# Patient Record
Sex: MALE
[De-identification: names, ages, dates, MRNs, and addresses within clinical notes are randomized per-mention and may not be internally consistent; named-entity substitution may affect disease eponyms.]

---

## 2020-11-27 ENCOUNTER — TRANSCRIPTION ENCOUNTER (OUTPATIENT)
Age: 20
End: 2020-11-27

## 2021-03-22 ENCOUNTER — TRANSCRIPTION ENCOUNTER (OUTPATIENT)
Age: 21
End: 2021-03-22

## 2024-01-22 PROBLEM — Z00.00 ENCOUNTER FOR PREVENTIVE HEALTH EXAMINATION: Status: ACTIVE | Noted: 2024-01-22

## 2024-01-24 DIAGNOSIS — M25.551 PAIN IN RIGHT HIP: ICD-10-CM

## 2024-01-25 ENCOUNTER — APPOINTMENT (OUTPATIENT)
Dept: ORTHOPEDIC SURGERY | Facility: CLINIC | Age: 24
End: 2024-01-25
Payer: COMMERCIAL

## 2024-01-25 VITALS
HEIGHT: 69 IN | HEART RATE: 92 BPM | BODY MASS INDEX: 33.33 KG/M2 | WEIGHT: 225 LBS | DIASTOLIC BLOOD PRESSURE: 73 MMHG | SYSTOLIC BLOOD PRESSURE: 116 MMHG

## 2024-01-25 DIAGNOSIS — Z78.9 OTHER SPECIFIED HEALTH STATUS: ICD-10-CM

## 2024-01-25 DIAGNOSIS — M70.61 TROCHANTERIC BURSITIS, RIGHT HIP: ICD-10-CM

## 2024-01-25 DIAGNOSIS — S76.311A STRAIN OF MUSCLE, FASCIA AND TENDON OF THE POSTERIOR MUSCLE GROUP AT THIGH LEVEL, RIGHT THIGH, INITIAL ENCOUNTER: ICD-10-CM

## 2024-01-25 PROCEDURE — 73502 X-RAY EXAM HIP UNI 2-3 VIEWS: CPT | Mod: RT

## 2024-01-25 PROCEDURE — 99203 OFFICE O/P NEW LOW 30 MIN: CPT

## 2024-01-25 PROCEDURE — 73552 X-RAY EXAM OF FEMUR 2/>: CPT | Mod: RT

## 2024-01-25 RX ORDER — DICLOFENAC SODIUM 1% 10 MG/G
1 GEL TOPICAL DAILY
Qty: 1 | Refills: 1 | Status: ACTIVE | COMMUNITY
Start: 2024-01-25 | End: 1900-01-01

## 2024-01-29 ENCOUNTER — NON-APPOINTMENT (OUTPATIENT)
Age: 24
End: 2024-01-29

## 2024-01-29 ENCOUNTER — APPOINTMENT (OUTPATIENT)
Dept: ORTHOPEDIC SURGERY | Facility: CLINIC | Age: 24
End: 2024-01-29
Payer: COMMERCIAL

## 2024-01-29 VITALS — WEIGHT: 225 LBS | HEIGHT: 69 IN | BODY MASS INDEX: 33.33 KG/M2

## 2024-01-29 PROCEDURE — 73502 X-RAY EXAM HIP UNI 2-3 VIEWS: CPT

## 2024-01-29 PROCEDURE — 99214 OFFICE O/P EST MOD 30 MIN: CPT

## 2024-01-29 NOTE — ADDENDUM
[FreeTextEntry1] : Documented by Serenity Musa acting as a scribe for Dr. Calix on 01/29/2024. All medical record entries made by the Scribe were at my, Dr. Calix's, direction and personally dictated by me on 01/29/2024. I have reviewed the chart and agree that the record accurately reflects my personal performance of the history, physical exam, procedure and imaging.

## 2024-01-29 NOTE — HISTORY OF PRESENT ILLNESS
[de-identified] : Jayme Jett is a 23 male here today for right hip pain. He denies any clicking/ popping or buckling/ instability of the hip. He notes he has had this pain for two years. It is localized to the area and is aggravated after doing deep squats at the gym.

## 2024-01-29 NOTE — CONSULT LETTER
[Dear  ___] : Dear  [unfilled], [Consult Letter:] : I had the pleasure of evaluating your patient, [unfilled]. [Please see my note below.] : Please see my note below. [Sincerely,] : Sincerely, [FreeTextEntry3] : Dr. Gabriele Calix

## 2024-01-29 NOTE — PHYSICAL EXAM
[de-identified] : General: Well appearing, no acute distress Neurologic: A&Ox3, No focal deficits Head: NCAT without abrasions, lacerations, or ecchymosis to head, face, or scalp Eyes: No scleral icterus, no gross abnormalities Respiratory: Equal chest wall expansion bilaterally, no accessory muscle use Lymphatic: No lymphadenopathy palpated Skin: Warm and dry Psychiatric: Normal mood and affect   Examination of the Right hip reveals no obvious deformity or leg length discrepancy. There is no swelling noted. The patient is nontender to palpation over the greater trochanter, groin, and IT band. The patients range of motion is to 110 degrees of hip flexion, 40 degrees of abduction, 40 degrees of abduction, 20 degrees of adduction, 15 degrees of internal rotation with pain and 35 degrees of external rotation. The patient has 5/5 strength to resisted hip flexion, abduction and adduction. The patient has a positive MUNIR and positive FADIR Test. Positive Geller Test. Positive resisted SLR test at 30 degrees of hip flexion ([facility]). Negative Piriformis Test. No SI joint instability. There is no pain with logrolling. The calf and thigh are soft and nontender bilaterally. The patient is grossly neurovascularly intact distally.   [de-identified] : The following radiographs were ordered and read by me during this patient's visit. I reviewed each radiograph in detail with the patient and discussed the findings as highlighted below. 2 views of the right hip were obtained today that show no fracture, dislocation. There is hip impingement noted.

## 2024-01-29 NOTE — DISCUSSION/SUMMARY
[de-identified] : We had a thorough discussion regarding the nature of his pain, the pathophysiology, as well as all treatment options. Patient was given prescription of formal physical therapy that he will perform 2x/wk for 6-8 wks. Considering the patient's current presentation of pain, physical exam, and radiographs an MRI is indicated at this time. A prescription for this was given to the patient. We will go over the MRI results with him upon obtaining the results in the office and advise him of further treatment options. He agrees with the above plan and all questions were answered.

## 2024-02-01 ENCOUNTER — NON-APPOINTMENT (OUTPATIENT)
Age: 24
End: 2024-02-01

## 2024-02-03 ENCOUNTER — OUTPATIENT (OUTPATIENT)
Dept: OUTPATIENT SERVICES | Facility: HOSPITAL | Age: 24
LOS: 1 days | End: 2024-02-03
Payer: COMMERCIAL

## 2024-02-03 ENCOUNTER — NON-APPOINTMENT (OUTPATIENT)
Age: 24
End: 2024-02-03

## 2024-02-03 ENCOUNTER — APPOINTMENT (OUTPATIENT)
Dept: MRI IMAGING | Facility: CLINIC | Age: 24
End: 2024-02-03
Payer: COMMERCIAL

## 2024-02-03 DIAGNOSIS — M70.61 TROCHANTERIC BURSITIS, RIGHT HIP: ICD-10-CM

## 2024-02-03 PROCEDURE — 73721 MRI JNT OF LWR EXTRE W/O DYE: CPT | Mod: 26,RT

## 2024-02-03 PROCEDURE — 73721 MRI JNT OF LWR EXTRE W/O DYE: CPT

## 2024-02-05 ENCOUNTER — APPOINTMENT (OUTPATIENT)
Dept: MRI IMAGING | Facility: CLINIC | Age: 24
End: 2024-02-05

## 2024-02-08 ENCOUNTER — APPOINTMENT (OUTPATIENT)
Dept: ORTHOPEDIC SURGERY | Facility: CLINIC | Age: 24
End: 2024-02-08
Payer: COMMERCIAL

## 2024-02-08 DIAGNOSIS — S73.191D OTHER SPRAIN OF RIGHT HIP, SUBSEQUENT ENCOUNTER: ICD-10-CM

## 2024-02-08 DIAGNOSIS — M25.852 OTHER SPECIFIED JOINT DISORDERS, LEFT HIP: ICD-10-CM

## 2024-02-08 DIAGNOSIS — M25.851 OTHER SPECIFIED JOINT DISORDERS, RIGHT HIP: ICD-10-CM

## 2024-02-08 PROCEDURE — 99214 OFFICE O/P EST MOD 30 MIN: CPT

## 2024-02-08 NOTE — DISCUSSION/SUMMARY
[de-identified] : We had a thorough discussion regarding the nature of his pain, the pathophysiology, as well as all treatment options. Based on clinical exam, MRI and radiograph findings they have a labral separation. I discussed operative and non-operative treatment modalities. All risks, benefits and alternatives to the proposed surgical procedure, right hip arthroscopy with labrum repair, were discussed in great detail with the patient. Risks include but are not limited to pain, bleeding, infection, stiffness, medical complications (including DVT, PE, MI), and risks of anesthesia. The patient expressed understanding and all questions were answered. The patient is considering whether to proceed. All questions were answered.

## 2024-02-08 NOTE — ADDENDUM
[FreeTextEntry1] : Documented by Serenity Musa acting as a scribe for Dr. Calix on 02/08/2024. All medical record entries made by the Scribe were at my, Dr. Calix's, direction and personally dictated by me on 02/08/2024. I have reviewed the chart and agree that the record accurately reflects my personal performance of the history, physical exam, procedure and imaging.

## 2024-02-08 NOTE — HISTORY OF PRESENT ILLNESS
[de-identified] : Jayme Jett is a 23 male here today for right hip pain. He denies any clicking/ popping or buckling/ instability of the hip. He notes he has had this pain for two years. It is localized to the area and is aggravated after doing deep squats at the gym. He is here today for an MRI review.

## 2024-02-08 NOTE — PHYSICAL EXAM
[de-identified] : General: Well appearing, no acute distress Neurologic: A&Ox3, No focal deficits Head: NCAT without abrasions, lacerations, or ecchymosis to head, face, or scalp Eyes: No scleral icterus, no gross abnormalities Respiratory: Equal chest wall expansion bilaterally, no accessory muscle use Lymphatic: No lymphadenopathy palpated Skin: Warm and dry Psychiatric: Normal mood and affect   Examination of the Right hip reveals no obvious deformity or leg length discrepancy. There is no swelling noted. The patient is nontender to palpation over the greater trochanter, groin, and IT band. The patients range of motion is to 110 degrees of hip flexion, 40 degrees of abduction, 40 degrees of abduction, 20 degrees of adduction, 15 degrees of internal rotation with pain and 35 degrees of external rotation. The patient has 5/5 strength to resisted hip flexion, abduction and adduction. The patient has a positive MUNIR and positive FADIR Test. Positive Geller Test. Positive resisted SLR test at 30 degrees of hip flexion ([facility]). Negative Piriformis Test. No SI joint instability. There is no pain with logrolling. The calf and thigh are soft and nontender bilaterally. The patient is grossly neurovascularly intact distally.   [de-identified] : MR HIP RT - ORDERED BY:  ABRAM RANDLE PROCEDURE DATE:  02/03/2024  IMPRESSION: 1.  No acute labral tear. Chondral labral separation in the anterior and superior labrum. 2.  Cortical prominence along the anterior femoral head-neck junction consistent with mild cam deformity. 3.  No proximal hamstring injury.

## 2024-02-17 ENCOUNTER — OUTPATIENT (OUTPATIENT)
Dept: OUTPATIENT SERVICES | Facility: HOSPITAL | Age: 24
LOS: 1 days | End: 2024-02-17
Payer: COMMERCIAL

## 2024-02-17 ENCOUNTER — APPOINTMENT (OUTPATIENT)
Dept: MRI IMAGING | Facility: CLINIC | Age: 24
End: 2024-02-17

## 2024-02-17 DIAGNOSIS — M70.61 TROCHANTERIC BURSITIS, RIGHT HIP: ICD-10-CM

## 2024-02-17 PROCEDURE — 73718 MRI LOWER EXTREMITY W/O DYE: CPT

## 2024-02-18 ENCOUNTER — TRANSCRIPTION ENCOUNTER (OUTPATIENT)
Age: 24
End: 2024-02-18

## 2024-09-26 ENCOUNTER — NON-APPOINTMENT (OUTPATIENT)
Age: 24
End: 2024-09-26

## 2024-12-18 ENCOUNTER — NON-APPOINTMENT (OUTPATIENT)
Age: 24
End: 2024-12-18

## 2025-01-10 ENCOUNTER — TRANSCRIPTION ENCOUNTER (OUTPATIENT)
Age: 25
End: 2025-01-10

## 2025-01-10 ENCOUNTER — APPOINTMENT (OUTPATIENT)
Dept: ORTHOPEDIC SURGERY | Facility: HOSPITAL | Age: 25
End: 2025-01-10

## 2025-01-10 ENCOUNTER — OUTPATIENT (OUTPATIENT)
Dept: INPATIENT UNIT | Facility: HOSPITAL | Age: 25
LOS: 1 days | Discharge: HOME | End: 2025-01-10
Payer: COMMERCIAL

## 2025-01-10 VITALS
TEMPERATURE: 98 F | SYSTOLIC BLOOD PRESSURE: 120 MMHG | DIASTOLIC BLOOD PRESSURE: 73 MMHG | RESPIRATION RATE: 15 BRPM | OXYGEN SATURATION: 99 % | HEART RATE: 82 BPM

## 2025-01-10 VITALS
HEART RATE: 84 BPM | RESPIRATION RATE: 16 BRPM | DIASTOLIC BLOOD PRESSURE: 79 MMHG | SYSTOLIC BLOOD PRESSURE: 129 MMHG | TEMPERATURE: 99 F | HEIGHT: 69 IN | WEIGHT: 225.09 LBS | OXYGEN SATURATION: 100 %

## 2025-01-10 DIAGNOSIS — M25.851 OTHER SPECIFIED JOINT DISORDERS, RIGHT HIP: ICD-10-CM

## 2025-01-10 PROCEDURE — 76000 FLUOROSCOPY <1 HR PHYS/QHP: CPT

## 2025-01-10 PROCEDURE — 29999 UNLISTED PX ARTHROSCOPY: CPT | Mod: RT

## 2025-01-10 PROCEDURE — 29916 HIP ARTHRO W/LABRAL REPAIR: CPT | Mod: RT

## 2025-01-10 PROCEDURE — 29914 HIP ARTHRO W/FEMOROPLASTY: CPT | Mod: RT

## 2025-01-10 PROCEDURE — C1713: CPT

## 2025-01-10 RX ORDER — ONDANSETRON 4 MG/1
1 TABLET ORAL
Qty: 30 | Refills: 0
Start: 2025-01-10 | End: 2025-01-14

## 2025-01-10 RX ORDER — ACETAMINOPHEN 80 MG/.8ML
2 SOLUTION/ DROPS ORAL
Qty: 30 | Refills: 0
Start: 2025-01-10 | End: 2025-01-14

## 2025-01-10 RX ORDER — CYCLOBENZAPRINE HCL 10 MG
1 TABLET ORAL
Qty: 21 | Refills: 0
Start: 2025-01-10 | End: 2025-01-16

## 2025-01-10 RX ORDER — FENTANYL 75 UG/H
50 PATCH, EXTENDED RELEASE TRANSDERMAL
Refills: 0 | Status: DISCONTINUED | OUTPATIENT
Start: 2025-01-10 | End: 2025-01-10

## 2025-01-10 RX ORDER — ONDANSETRON 4 MG/1
4 TABLET ORAL ONCE
Refills: 0 | Status: DISCONTINUED | OUTPATIENT
Start: 2025-01-10 | End: 2025-01-10

## 2025-01-10 RX ORDER — ASPIRIN 81 MG
1 TABLET, DELAYED RELEASE (ENTERIC COATED) ORAL
Qty: 30 | Refills: 0
Start: 2025-01-10 | End: 2025-02-08

## 2025-01-10 RX ORDER — NALOXONE HCL 0.4 MG/ML
1 VIAL (ML) INJECTION
Qty: 1 | Refills: 0
Start: 2025-01-10

## 2025-01-10 RX ORDER — OXYCODONE HCL 15 MG
5 TABLET ORAL ONCE
Refills: 0 | Status: DISCONTINUED | OUTPATIENT
Start: 2025-01-10 | End: 2025-01-10

## 2025-01-10 RX ORDER — SENNOSIDES AND DOCUSATE SODIUM 50; 8.6 MG/1; MG/1
2 TABLET ORAL
Qty: 20 | Refills: 0
Start: 2025-01-10 | End: 2025-01-14

## 2025-01-10 RX ORDER — SODIUM CHLORIDE 9 MG/ML
1000 INJECTION, SOLUTION INTRAVENOUS
Refills: 0 | Status: DISCONTINUED | OUTPATIENT
Start: 2025-01-10 | End: 2025-01-10

## 2025-01-10 RX ORDER — OXYCODONE HCL 15 MG
1 TABLET ORAL
Qty: 20 | Refills: 0
Start: 2025-01-10 | End: 2025-01-14

## 2025-01-10 RX ADMIN — FENTANYL 50 MICROGRAM(S): 75 PATCH, EXTENDED RELEASE TRANSDERMAL at 16:38

## 2025-01-10 RX ADMIN — SODIUM CHLORIDE 100 MILLILITER(S): 9 INJECTION, SOLUTION INTRAVENOUS at 16:39

## 2025-01-10 RX ADMIN — Medication 5 MILLIGRAM(S): at 16:35

## 2025-01-10 RX ADMIN — Medication 5 MILLIGRAM(S): at 16:38

## 2025-01-10 RX ADMIN — FENTANYL 50 MICROGRAM(S): 75 PATCH, EXTENDED RELEASE TRANSDERMAL at 16:30

## 2025-01-10 NOTE — ASU PATIENT PROFILE, ADULT - AS SC [employer]ADEN MOBILITY

## 2025-01-10 NOTE — ASU DISCHARGE PLAN (ADULT/PEDIATRIC) - ASU DC SPECIAL INSTRUCTIONSFT
Hip Arthroscopy Post op    ACTIVITY: Usually during the first 2 or 3 days, there is not a lot of moving around.  We do encourage you to get about with the crutches intermittently to do your usual daily activities (i.e. use the restroom, etc.)     PAIN MEDS:  We will send an eRx prescription to your pharmacy narcotic pain medication on the day of your surgery.  We do not generally write these before that day.  As your pain gets better, you can switch to Tylenol and/or ibuprofen.     CRUTCHES: Most need crutches for 2-4 weeks.  This means you can touch your foot down to the floor, but not put all of your weight on it.  If you have an isolated labral tear (no impingement/no osteoplasty), you will need crutches for 2 weeks.     STITCHES:  will be removed at about 2 weeks.     SCHOOL/WORK: Generally, most students miss about a week of school.  For work, it depends on what you do. Time out from work is also variable.  This will not only depend on the procedure but what your work requirements are. If you have a job that is mostly sitting you should be able to return in about a week. If you have a physical job you should discuss this with Dr. Calix or ANA Miller.     DRIVING: You can drive once you are fully weight bearing and it is comfortable to do so. You must be able to perform an emergency stop without hesitation. It may be 1-2 weeks before you are comfortable driving if it is your left hip and the car is an automatic.  If surgery is on your right hip then you will not be able to drive from anywhere between 2- 4 weeks. You must be off of pain medication to drive.     PHYSICAL THERAPY: You will get a prescription for this. You can schedule your first PT visit anytime for about one week after.  Therapy is usually two to three times per week.     BRACE: The Hip Abduction brace should remain on for WALKING. You do not need to wear it in bed or in the car or sitting.    SHOWERING: You may shower 2 days post-op with a water proof dressing, but soaking in a tub should be avoided until your 2 week appointment to check if wounds are healed.     **Please refer to patient post op info packet given to you at office visit for further details.

## 2025-01-10 NOTE — ASU DISCHARGE PLAN (ADULT/PEDIATRIC) - CARE PROVIDER_API CALL
Gabriele Calix  Orthopaedic Sports Medicine  [address], [address]  [place] [address]  Phone: [phone]  Fax: [phone]  Follow Up Time:

## 2025-01-10 NOTE — BRIEF OPERATIVE NOTE - NSICDXBRIEFPREOP_GEN_ALL_CORE_FT
Mom is requesting a refill of kenalog cream to be sent to the pharmacy on file.    PRE-OP DIAGNOSIS:  Labral tear of right hip joint 10-John-2025 15:58:58 with ALFREDO James Raygoza

## 2025-01-10 NOTE — BRIEF OPERATIVE NOTE - NSICDXBRIEFPROCEDURE_GEN_ALL_CORE_FT
PROCEDURES:  Labral repair of hip 10-John-2025 15:58:12 right, with femoroplasty and acetabultoplasty James Raygoza

## 2025-01-10 NOTE — ASU DISCHARGE PLAN (ADULT/PEDIATRIC) - NS MD DC FALL RISK RISK
For information on Fall & Injury Prevention, visit: [email or web address] OR  [email or web address] OR  https://www.cdc.gov/steadi/patient.html

## 2025-01-13 DIAGNOSIS — M25.851 OTHER SPECIFIED JOINT DISORDERS, RIGHT HIP: ICD-10-CM

## 2025-01-13 DIAGNOSIS — M25.551 PAIN IN RIGHT HIP: ICD-10-CM

## 2025-01-13 DIAGNOSIS — M24.151 OTHER ARTICULAR CARTILAGE DISORDERS, RIGHT HIP: ICD-10-CM

## 2025-01-17 ENCOUNTER — NON-APPOINTMENT (OUTPATIENT)
Age: 25
End: 2025-01-17

## 2025-01-23 ENCOUNTER — APPOINTMENT (OUTPATIENT)
Dept: ORTHOPEDIC SURGERY | Facility: CLINIC | Age: 25
End: 2025-01-23
Payer: COMMERCIAL

## 2025-01-23 DIAGNOSIS — M25.851 OTHER SPECIFIED JOINT DISORDERS, RIGHT HIP: ICD-10-CM

## 2025-01-23 DIAGNOSIS — S73.191D OTHER SPRAIN OF RIGHT HIP, SUBSEQUENT ENCOUNTER: ICD-10-CM

## 2025-01-23 PROCEDURE — 73502 X-RAY EXAM HIP UNI 2-3 VIEWS: CPT

## 2025-01-23 PROCEDURE — 99024 POSTOP FOLLOW-UP VISIT: CPT

## 2025-02-15 ENCOUNTER — NON-APPOINTMENT (OUTPATIENT)
Age: 25
End: 2025-02-15

## 2025-02-19 ENCOUNTER — APPOINTMENT (OUTPATIENT)
Dept: ORTHOPEDIC SURGERY | Facility: CLINIC | Age: 25
End: 2025-02-19
Payer: COMMERCIAL

## 2025-02-19 DIAGNOSIS — S73.191D OTHER SPRAIN OF RIGHT HIP, SUBSEQUENT ENCOUNTER: ICD-10-CM

## 2025-02-19 DIAGNOSIS — M25.851 OTHER SPECIFIED JOINT DISORDERS, RIGHT HIP: ICD-10-CM

## 2025-02-19 PROCEDURE — 99024 POSTOP FOLLOW-UP VISIT: CPT

## 2025-03-17 ENCOUNTER — APPOINTMENT (OUTPATIENT)
Dept: ORTHOPEDIC SURGERY | Facility: CLINIC | Age: 25
End: 2025-03-17
Payer: COMMERCIAL

## 2025-03-17 DIAGNOSIS — S73.191D OTHER SPRAIN OF RIGHT HIP, SUBSEQUENT ENCOUNTER: ICD-10-CM

## 2025-03-17 DIAGNOSIS — M25.851 OTHER SPECIFIED JOINT DISORDERS, RIGHT HIP: ICD-10-CM

## 2025-03-17 PROCEDURE — 99024 POSTOP FOLLOW-UP VISIT: CPT

## 2025-05-19 ENCOUNTER — APPOINTMENT (OUTPATIENT)
Dept: ORTHOPEDIC SURGERY | Facility: CLINIC | Age: 25
End: 2025-05-19
Payer: COMMERCIAL

## 2025-05-19 DIAGNOSIS — M25.851 OTHER SPECIFIED JOINT DISORDERS, RIGHT HIP: ICD-10-CM

## 2025-05-19 DIAGNOSIS — S73.191D OTHER SPRAIN OF RIGHT HIP, SUBSEQUENT ENCOUNTER: ICD-10-CM

## 2025-05-19 PROCEDURE — 99214 OFFICE O/P EST MOD 30 MIN: CPT

## 2025-09-15 ENCOUNTER — APPOINTMENT (OUTPATIENT)
Dept: ORTHOPEDIC SURGERY | Facility: CLINIC | Age: 25
End: 2025-09-15